# Patient Record
Sex: FEMALE | Race: BLACK OR AFRICAN AMERICAN
[De-identification: names, ages, dates, MRNs, and addresses within clinical notes are randomized per-mention and may not be internally consistent; named-entity substitution may affect disease eponyms.]

---

## 2020-11-27 ENCOUNTER — HOSPITAL ENCOUNTER (EMERGENCY)
Dept: HOSPITAL 72 - EMR | Age: 45
LOS: 1 days | Discharge: HOME | End: 2020-11-28
Payer: MEDICAID

## 2020-11-27 VITALS — HEIGHT: 68 IN | WEIGHT: 175 LBS | BODY MASS INDEX: 26.52 KG/M2

## 2020-11-27 DIAGNOSIS — R53.81: Primary | ICD-10-CM

## 2020-11-27 DIAGNOSIS — M50.30: ICD-10-CM

## 2020-11-27 DIAGNOSIS — M54.5: ICD-10-CM

## 2020-11-27 DIAGNOSIS — M54.2: ICD-10-CM

## 2020-11-27 DIAGNOSIS — Z88.8: ICD-10-CM

## 2020-11-27 PROCEDURE — 96360 HYDRATION IV INFUSION INIT: CPT

## 2020-11-27 PROCEDURE — 71045 X-RAY EXAM CHEST 1 VIEW: CPT

## 2020-11-27 PROCEDURE — 80053 COMPREHEN METABOLIC PANEL: CPT

## 2020-11-27 PROCEDURE — 84484 ASSAY OF TROPONIN QUANT: CPT

## 2020-11-27 PROCEDURE — 85025 COMPLETE CBC W/AUTO DIFF WBC: CPT

## 2020-11-27 PROCEDURE — 36415 COLL VENOUS BLD VENIPUNCTURE: CPT

## 2020-11-27 PROCEDURE — 99284 EMERGENCY DEPT VISIT MOD MDM: CPT

## 2020-11-27 PROCEDURE — 93005 ELECTROCARDIOGRAM TRACING: CPT

## 2020-11-27 PROCEDURE — 81025 URINE PREGNANCY TEST: CPT

## 2020-11-27 PROCEDURE — 70450 CT HEAD/BRAIN W/O DYE: CPT

## 2020-11-27 PROCEDURE — 81003 URINALYSIS AUTO W/O SCOPE: CPT

## 2020-11-28 VITALS — DIASTOLIC BLOOD PRESSURE: 75 MMHG | SYSTOLIC BLOOD PRESSURE: 110 MMHG

## 2020-11-28 VITALS — DIASTOLIC BLOOD PRESSURE: 63 MMHG | SYSTOLIC BLOOD PRESSURE: 95 MMHG

## 2020-11-28 VITALS — DIASTOLIC BLOOD PRESSURE: 76 MMHG | SYSTOLIC BLOOD PRESSURE: 110 MMHG

## 2020-11-28 LAB
ADD MANUAL DIFF: NO
ALBUMIN SERPL-MCNC: 3.5 G/DL (ref 3.4–5)
ALBUMIN/GLOB SERPL: 1.1 {RATIO} (ref 1–2.7)
ALP SERPL-CCNC: 42 U/L (ref 46–116)
ALT SERPL-CCNC: 20 U/L (ref 12–78)
ANION GAP SERPL CALC-SCNC: 6 MMOL/L (ref 5–15)
APPEARANCE UR: CLEAR
APTT PPP: (no result) S
AST SERPL-CCNC: 17 U/L (ref 15–37)
BASOPHILS NFR BLD AUTO: 1 % (ref 0–2)
BILIRUB SERPL-MCNC: 0.4 MG/DL (ref 0.2–1)
BUN SERPL-MCNC: 10 MG/DL (ref 7–18)
CALCIUM SERPL-MCNC: 8.4 MG/DL (ref 8.5–10.1)
CHLORIDE SERPL-SCNC: 103 MMOL/L (ref 98–107)
CO2 SERPL-SCNC: 29 MMOL/L (ref 21–32)
CREAT SERPL-MCNC: 0.9 MG/DL (ref 0.55–1.3)
EOSINOPHIL NFR BLD AUTO: 2.6 % (ref 0–3)
ERYTHROCYTE [DISTWIDTH] IN BLOOD BY AUTOMATED COUNT: 12.1 % (ref 11.6–14.8)
GLOBULIN SER-MCNC: 3.3 G/DL
GLUCOSE UR STRIP-MCNC: NEGATIVE MG/DL
HCT VFR BLD CALC: 38.2 % (ref 37–47)
HGB BLD-MCNC: 12.9 G/DL (ref 12–16)
KETONES UR QL STRIP: NEGATIVE
LEUKOCYTE ESTERASE UR QL STRIP: NEGATIVE
LYMPHOCYTES NFR BLD AUTO: 37.8 % (ref 20–45)
MCV RBC AUTO: 88 FL (ref 80–99)
MONOCYTES NFR BLD AUTO: 6.9 % (ref 1–10)
NEUTROPHILS NFR BLD AUTO: 51.7 % (ref 45–75)
NITRITE UR QL STRIP: NEGATIVE
PH UR STRIP: 6 [PH] (ref 4.5–8)
PLATELET # BLD: 220 K/UL (ref 150–450)
POTASSIUM SERPL-SCNC: 3.5 MMOL/L (ref 3.5–5.1)
PROT UR QL STRIP: NEGATIVE
RBC # BLD AUTO: 4.35 M/UL (ref 4.2–5.4)
SODIUM SERPL-SCNC: 138 MMOL/L (ref 136–145)
SP GR UR STRIP: 1.01 (ref 1–1.03)
UROBILINOGEN UR-MCNC: NORMAL MG/DL (ref 0–1)
WBC # BLD AUTO: 9.3 K/UL (ref 4.8–10.8)

## 2020-11-28 NOTE — DIAGNOSTIC IMAGING REPORT
EXAM:

  XR Chest, 1 View

 

CLINICAL HISTORY:

  SYNCOPE

 

TECHNIQUE:

  Frontal view of the chest.

 

COMPARISON:

  No relevant prior studies available.

 

FINDINGS:

  Lungs:  Unremarkable.  No consolidation.

  Pleural space:  Unremarkable.  No pneumothorax.

  Heart:  Unremarkable.  No cardiomegaly.

  Mediastinum:  Unremarkable.

  Bones/joints:  No acute abnormality

 

IMPRESSION:     

1.  No acute cardiopulmonary disease.

2.  If there is continued concern consider frontal and lateral chest 

radiographs or CT.

## 2020-11-28 NOTE — DIAGNOSTIC IMAGING REPORT
EXAM:

  CT Head Without Intravenous Contrast

 

CLINICAL HISTORY:

  PAIN

 

TECHNIQUE:

  Axial computed tomography images of the head/brain without intravenous 

contrast.  CTDI is 53.40 mGy and DLP is 965.40 mGy-cm.  One or more of 

the following dose reduction techniques were used: automated exposure 

control, adjustment of the mA and/or kV according to patient size, use of 

iterative reconstruction technique.

 

COMPARISON:

  No relevant prior studies available.

 

FINDINGS:

  Brain:  Unremarkable.  No hemorrhage.  No significant white matter 

disease.  No edema.

  Ventricles:  Unremarkable.  No ventriculomegaly.

  Bones/joints:  Unremarkable.  No acute fracture.

  Soft tissues:  Unremarkable.

  Sinuses:  Unremarkable as visualized.  No acute sinusitis.

  Mastoid air cells:  Unremarkable as visualized.  No mastoid effusion.

 

IMPRESSION:     

  Unremarkable head/brain CT.

## 2020-11-28 NOTE — EMERGENCY ROOM REPORT
History of Present Illness


General


Chief Complaint:  Syncope


Source:  Patient





Present Illness


HPI


Patient is a 45-year-old female who presents for increased generalized malaise. 

Patient had recent syncopal episode.  Reports feeling some low back pain as well

as some pain in the left side of her neck.  Reports hitting her head.  Prior 

history of cervical disc disease.  Denies any recent trauma.  States that she 

has not had any leg pain or swelling.   does not take any medication regularly. 

Denies any heavy alcohol intake.  Reported feeling lightheaded prior to onset of

syncope.


Allergies:  


Coded Allergies:  


     BACITRACIN (Verified  Allergy, Unknown, RASH, 11/28/20)


     NEOMYCIN (Verified  Allergy, Unknown, RASH, 11/28/20)


     POLYMYXIN B (Verified  Allergy, Unknown, RASH, 11/28/20)





COVID-19 Screening


Contact w/high risk pt:  No


Experienced COVID-19 symptoms?:  No


COVID-19 Testing performed PTA:  No





Patient History


Past Medical History:  see triage record


Reviewed Nursing Documentation:  PMH: Agreed; PSxH: Agreed





Nursing Documentation-PMH


Past Medical History:  No Stated History





Review of Systems


All Other Systems:  negative except mentioned in HPI





Physical Exam





Vital Signs








  Date Time  Temp Pulse Resp B/P (MAP) Pulse Ox O2 Delivery O2 Flow Rate FiO2


 


11/27/20 23:56 98.2 72 15 95/63 (74) 98 Room Air  








Sp02 EP Interpretation:  reviewed, normal


General Appearance:  normal inspection, well appearing, no apparent distress, 

alert, GCS 15, non-toxic


Head:  atraumatic


ENT:  normal ENT inspection, hearing grossly normal, normal voice


Neck:  normal inspection, full range of motion, supple, no bony tend


Respiratory:  normal inspection, lungs clear, normal breath sounds, no respirat

ory distress, no retraction, no wheezing


Cardiovascular #1:  regular rate, rhythm, no edema


Gastrointestinal:  normal inspection, normal bowel sounds, non tender, soft, no 

guarding, no hernia


Genitourinary:  no CVA tenderness


Musculoskeletal:  normal inspection, back normal, normal range of motion


Neurologic:  alert, motor strength/tone normal, CNs III-XII nml as tested, 

oriented x3, responsive, speech normal, normal inspection


Psychiatric:  normal inspection, judgement/insight normal, mood/affect normal


Skin:  no rash





Medical Decision Making


Diagnostic Impression:  


   Primary Impression:  


   Syncope


   Additional Impression:  


   Head injury


ER Course


Patient presented for syncopal episode.  Differential diagnosis include was not 

limited to head injury, anemia, arrhythmia among others.  Because of complexity 

of patient's case laboratory tests and imaging studies were ordered.  CT imaging

read by radiology showed no acute intracranial process.  Chest x-ray was 

unremarkable.  Patient's laboratory testing did not show any evidence of anemia.

  Patient was advised not to drive or operate heavy machinery.  The patient is 

advised to follow up with  primary care doctor in 1-2 days.  Patient is advised 

to return if any worsening condition or if any changes in status that are 

concerning.





This report is dictated with Dragon transcription software which may 

occasionally lead to discrepancies related to use of this software.





Labs








Test


 11/28/20


00:10 11/28/20


00:50


 


Urine Color Pale yellow  


 


Urine Appearance Clear  


 


Urine pH 6 (4.5-8.0)  


 


Urine Specific Gravity


 1.010


(1.005-1.035) 





 


Urine Protein


 Negative


(NEGATIVE) 





 


Urine Glucose (UA)


 Negative


(NEGATIVE) 





 


Urine Ketones


 Negative


(NEGATIVE) 





 


Urine Blood


 Negative


(NEGATIVE) 





 


Urine Nitrite


 Negative


(NEGATIVE) 





 


Urine Bilirubin


 Negative


(NEGATIVE) 





 


Urine Urobilinogen


 Normal MG/DL


(0.0-1.0) 





 


Urine Leukocyte Esterase


 Negative


(NEGATIVE) 





 


Urine HCG, Qualitative


 Negative


(NEGATIVE) 





 


White Blood Count


 


 9.3 K/UL


(4.8-10.8)


 


Red Blood Count


 


 4.35 M/UL


(4.20-5.40)


 


Hemoglobin


 


 12.9 G/DL


(12.0-16.0)


 


Hematocrit


 


 38.2 %


(37.0-47.0)


 


Mean Corpuscular Volume  88 FL (80-99) 


 


Mean Corpuscular Hemoglobin


 


 29.6 PG


(27.0-31.0)


 


Mean Corpuscular Hemoglobin


Concent 


 33.8 G/DL


(32.0-36.0)


 


Red Cell Distribution Width


 


 12.1 %


(11.6-14.8)


 


Platelet Count


 


 220 K/UL


(150-450)


 


Mean Platelet Volume


 


 7.3 FL


(6.5-10.1)


 


Neutrophils (%) (Auto)


 


 51.7 %


(45.0-75.0)


 


Lymphocytes (%) (Auto)


 


 37.8 %


(20.0-45.0)


 


Monocytes (%) (Auto)


 


 6.9 %


(1.0-10.0)


 


Eosinophils (%) (Auto)


 


 2.6 %


(0.0-3.0)


 


Basophils (%) (Auto)


 


 1.0 %


(0.0-2.0)


 


Sodium Level


 


 138 MMOL/L


(136-145)


 


Potassium Level


 


 3.5 MMOL/L


(3.5-5.1)


 


Chloride Level


 


 103 MMOL/L


()


 


Carbon Dioxide Level


 


 29 MMOL/L


(21-32)


 


Anion Gap


 


 6 mmol/L


(5-15)


 


Blood Urea Nitrogen


 


 10 mg/dL


(7-18)


 


Creatinine


 


 0.9 MG/DL


(0.55-1.30)


 


Estimat Glomerular Filtration


Rate 


 > 60 mL/min


(>60)


 


Glucose Level


 


 82 MG/DL


()


 


Calcium Level


 


 8.4 MG/DL


(8.5-10.1)


 


Total Bilirubin


 


 0.4 MG/DL


(0.2-1.0)


 


Aspartate Amino Transf


(AST/SGOT) 


 17 U/L (15-37) 





 


Alanine Aminotransferase


(ALT/SGPT) 


 20 U/L (12-78) 





 


Alkaline Phosphatase


 


 42 U/L


()


 


Troponin I


 


 0.001 ng/mL


(0.000-0.056)


 


Total Protein


 


 6.8 G/DL


(6.4-8.2)


 


Albumin


 


 3.5 G/DL


(3.4-5.0)


 


Globulin  3.3 g/dL 


 


Albumin/Globulin Ratio  1.1 (1.0-2.7) 








EKG Diagnostic Results


Rate:  normal


Rhythm:  NSR


ST Segments:  no acute changes





Last Vital Signs








  Date Time  Temp Pulse Resp B/P (MAP) Pulse Ox O2 Delivery O2 Flow Rate FiO2


 


11/28/20 00:13 98.2 68 15 95/63 98 Room Air  








Status:  improved


Disposition:  HOME, SELF-CARE


Condition:  Stable


Scripts


Acetaminophen* (ACETAMINOPHEN EXTRA STRENGTH*) 500 Mg Tablet


500 MG ORAL Q8H PRN for Fever/Headache/Mild Pain, #30 TAB


   Prov: Sharan Castillo MD         11/28/20


Referrals:  


[facility],REFERRING (PCP)











Sharan Castillo MD               Nov 28, 2020 00:33

## 2020-12-01 NOTE — CARDIOLOGY REPORT
--------------- APPROVED REPORT --------------





EKG Measurement

Heart [id number]

MA [id number]

[id number]

[id number]

[id number]



<Conclusion>

Normal sinus rhythm

Possible Left atrial enlargement

Borderline ECG